# Patient Record
Sex: MALE | Race: WHITE
[De-identification: names, ages, dates, MRNs, and addresses within clinical notes are randomized per-mention and may not be internally consistent; named-entity substitution may affect disease eponyms.]

---

## 2017-09-25 ENCOUNTER — HOSPITAL ENCOUNTER (EMERGENCY)
Dept: HOSPITAL 74 - JER | Age: 22
LOS: 1 days | Discharge: HOME | End: 2017-09-26
Payer: COMMERCIAL

## 2017-09-25 VITALS — SYSTOLIC BLOOD PRESSURE: 142 MMHG | HEART RATE: 110 BPM | DIASTOLIC BLOOD PRESSURE: 90 MMHG

## 2017-09-25 VITALS — TEMPERATURE: 98.6 F

## 2017-09-25 VITALS — BODY MASS INDEX: 27.8 KG/M2

## 2017-09-25 DIAGNOSIS — S50.312A: Primary | ICD-10-CM

## 2017-09-25 DIAGNOSIS — S80.212A: ICD-10-CM

## 2017-09-25 PROCEDURE — 3E0234Z INTRODUCTION OF SERUM, TOXOID AND VACCINE INTO MUSCLE, PERCUTANEOUS APPROACH: ICD-10-PCS
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
## 2017-10-03 NOTE — EKG
Test Reason : 

Blood Pressure : ***/*** mmHG

Vent. Rate : 137 BPM     Atrial Rate : 137 BPM

   P-R Int : 000 ms          QRS Dur : 086 ms

    QT Int : 378 ms       P-R-T Axes : 000 056 036 degrees

   QTc Int : 570 ms

 

SINUS TACHYCARDIA WITH SHORT CT

NONSPECIFIC ST AND T WAVE ABNORMALITY

ABNORMAL ECG

WHEN COMPARED WITH ECG OF 17-JUL-2015 14:02,

CT INTERVAL HAS DECREASED

VENT. RATE HAS INCREASED BY  72 BPM

T WAVE INVERSION NOW EVIDENT IN LATERAL LEADS

Confirmed by JOY MARSHALL MD ([id number]) on 10/3/2017 9:50:07 AM

 

Referred By:             Confirmed By:JOY MARSHALL MD

## 2018-07-26 ENCOUNTER — HOSPITAL ENCOUNTER (EMERGENCY)
Dept: HOSPITAL 74 - JER | Age: 23
Discharge: HOME | End: 2018-07-26
Payer: COMMERCIAL

## 2018-07-26 VITALS — TEMPERATURE: 98.1 F | HEART RATE: 90 BPM | SYSTOLIC BLOOD PRESSURE: 153 MMHG | DIASTOLIC BLOOD PRESSURE: 79 MMHG

## 2018-07-26 VITALS — BODY MASS INDEX: 27.9 KG/M2
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
## 2018-07-26 DIAGNOSIS — S60.012A: Primary | ICD-10-CM
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
## 2018-09-25 ENCOUNTER — HOSPITAL ENCOUNTER (EMERGENCY)
Dept: HOSPITAL 74 - JERFT | Age: 23
Discharge: HOME | End: 2018-09-25
Payer: COMMERCIAL

## 2018-09-25 VITALS — BODY MASS INDEX: 29.5 KG/M2

## 2018-09-25 VITALS — HEART RATE: 72 BPM | TEMPERATURE: 98 F | SYSTOLIC BLOOD PRESSURE: 146 MMHG | DIASTOLIC BLOOD PRESSURE: 100 MMHG
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
## 2018-09-25 DIAGNOSIS — S80.211A: ICD-10-CM
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
## 2018-09-25 DIAGNOSIS — S50.01XA: Primary | ICD-10-CM
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
## 2019-06-17 ENCOUNTER — HOSPITAL ENCOUNTER (EMERGENCY)
Dept: HOSPITAL 74 - JERFT | Age: 24
Discharge: HOME | End: 2019-06-17
Payer: COMMERCIAL

## 2022-10-27 ENCOUNTER — HOSPITAL ENCOUNTER (EMERGENCY)
Dept: HOSPITAL 74 - FER | Age: 27
Discharge: HOME | End: 2022-10-27
Payer: COMMERCIAL

## 2022-10-27 VITALS
RESPIRATION RATE: 20 BRPM | HEART RATE: 99 BPM | SYSTOLIC BLOOD PRESSURE: 167 MMHG | TEMPERATURE: 98.2 F | DIASTOLIC BLOOD PRESSURE: 85 MMHG

## 2022-10-27 VITALS — BODY MASS INDEX: 28.7 KG/M2
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
## 2022-10-27 DIAGNOSIS — S81.851A: Primary | ICD-10-CM

## 2024-09-30 ENCOUNTER — HOSPITAL ENCOUNTER (EMERGENCY)
Dept: HOSPITAL 74 - FER | Age: 29
Discharge: HOME | End: 2024-09-30
Payer: COMMERCIAL

## 2024-09-30 VITALS — BODY MASS INDEX: 30.8 KG/M2

## 2024-09-30 VITALS
RESPIRATION RATE: 16 BRPM | TEMPERATURE: 98.1 F | HEART RATE: 80 BPM | SYSTOLIC BLOOD PRESSURE: 147 MMHG | DIASTOLIC BLOOD PRESSURE: 107 MMHG
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
## 2024-09-30 DIAGNOSIS — S80.212A: Primary | ICD-10-CM
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
## 2024-09-30 DIAGNOSIS — M25.532: ICD-10-CM

## 2024-09-30 RX ADMIN — IBUPROFEN ONE MG: 400 TABLET, FILM COATED ORAL at 09:36
